# Patient Record
Sex: FEMALE | Race: WHITE | NOT HISPANIC OR LATINO | URBAN - METROPOLITAN AREA
[De-identification: names, ages, dates, MRNs, and addresses within clinical notes are randomized per-mention and may not be internally consistent; named-entity substitution may affect disease eponyms.]

---

## 2017-04-25 ENCOUNTER — APPOINTMENT (OUTPATIENT)
Dept: URBAN - METROPOLITAN AREA CLINIC 282 | Age: 62
Setting detail: DERMATOLOGY
End: 2017-04-26

## 2017-04-25 DIAGNOSIS — Z12.83 ENCOUNTER FOR SCREENING FOR MALIGNANT NEOPLASM OF SKIN: ICD-10-CM

## 2017-04-25 DIAGNOSIS — L98.8 OTHER SPECIFIED DISORDERS OF THE SKIN AND SUBCUTANEOUS TISSUE: ICD-10-CM

## 2017-04-25 DIAGNOSIS — Z71.89 OTHER SPECIFIED COUNSELING: ICD-10-CM

## 2017-04-25 DIAGNOSIS — D18.0 HEMANGIOMA: ICD-10-CM

## 2017-04-25 DIAGNOSIS — D22 MELANOCYTIC NEVI: ICD-10-CM

## 2017-04-25 PROBLEM — D22.5 MELANOCYTIC NEVI OF TRUNK: Status: ACTIVE | Noted: 2017-04-25

## 2017-04-25 PROBLEM — D22.71 MELANOCYTIC NEVI OF RIGHT LOWER LIMB, INCLUDING HIP: Status: ACTIVE | Noted: 2017-04-25

## 2017-04-25 PROBLEM — D18.01 HEMANGIOMA OF SKIN AND SUBCUTANEOUS TISSUE: Status: ACTIVE | Noted: 2017-04-25

## 2017-04-25 PROBLEM — D22.62 MELANOCYTIC NEVI OF LEFT UPPER LIMB, INCLUDING SHOULDER: Status: ACTIVE | Noted: 2017-04-25

## 2017-04-25 PROBLEM — D22.72 MELANOCYTIC NEVI OF LEFT LOWER LIMB, INCLUDING HIP: Status: ACTIVE | Noted: 2017-04-25

## 2017-04-25 PROBLEM — D22.61 MELANOCYTIC NEVI OF RIGHT UPPER LIMB, INCLUDING SHOULDER: Status: ACTIVE | Noted: 2017-04-25

## 2017-04-25 PROCEDURE — OTHER OBSERVATION AND MEASURE: OTHER

## 2017-04-25 PROCEDURE — OTHER COUNSELING: OTHER

## 2017-04-25 PROCEDURE — OTHER OBSERVATION: OTHER

## 2017-04-25 PROCEDURE — 99214 OFFICE O/P EST MOD 30 MIN: CPT

## 2017-04-25 ASSESSMENT — LOCATION DETAILED DESCRIPTION DERM
LOCATION DETAILED: RIGHT PROXIMAL LATERAL POSTERIOR UPPER ARM
LOCATION DETAILED: SUPERIOR THORACIC SPINE
LOCATION DETAILED: LEFT SUPERIOR MEDIAL MIDBACK
LOCATION DETAILED: LEFT ANTERIOR DISTAL UPPER ARM
LOCATION DETAILED: LEFT ULNAR DORSAL HAND
LOCATION DETAILED: RIGHT LATERAL ZYGOMA
LOCATION DETAILED: LEFT INFERIOR LATERAL MIDBACK
LOCATION DETAILED: LEFT ANTERIOR DISTAL THIGH
LOCATION DETAILED: INFERIOR THORACIC SPINE
LOCATION DETAILED: LEFT DISTAL POSTERIOR UPPER ARM
LOCATION DETAILED: RIGHT ANTERIOR PROXIMAL THIGH
LOCATION DETAILED: RIGHT BUTTOCK
LOCATION DETAILED: RIGHT DISTAL POSTERIOR THIGH

## 2017-04-25 ASSESSMENT — LOCATION SIMPLE DESCRIPTION DERM
LOCATION SIMPLE: RIGHT BUTTOCK
LOCATION SIMPLE: LEFT HAND
LOCATION SIMPLE: LEFT THIGH
LOCATION SIMPLE: LEFT LOWER BACK
LOCATION SIMPLE: LEFT UPPER ARM
LOCATION SIMPLE: LEFT POSTERIOR UPPER ARM
LOCATION SIMPLE: RIGHT POSTERIOR UPPER ARM
LOCATION SIMPLE: UPPER BACK
LOCATION SIMPLE: RIGHT THIGH
LOCATION SIMPLE: RIGHT ZYGOMA
LOCATION SIMPLE: RIGHT POSTERIOR THIGH

## 2017-04-25 ASSESSMENT — LOCATION ZONE DERM
LOCATION ZONE: HAND
LOCATION ZONE: ARM
LOCATION ZONE: LEG
LOCATION ZONE: TRUNK
LOCATION ZONE: FACE

## 2017-05-17 NOTE — PROCEDURE: OBSERVATION
[facility]    [address]    [facility] [address]    Phone:  [phone]       Thank You for choosing us for your health care visit. We are glad to serve you and happy to provide you with this summary of your visit. Please help us to ensure we have accurate records. If you find anything that needs to be changed, please let our staff know as soon as possible.          Your Demographic Information     Patient Name Sex     Brianna Clement Female 1957       Ethnic Group Patient Race    Not of  or  Origin White      Your Visit Details     Date & Time Provider Department    2017 10:00 AM MD [facility]      Your To Do List     Future Orders Please Complete On or Around Expires    MAMMO SCREENING BILATERAL  Jul 15, 2017 (Approximate) May 17, 2018    Follow-Up    Return in about 1 year (around 2018) for annual gyn exam.      We Ordered or Performed the Following     SERVICE TO PHYSICAL THERAPY       Conditions Discussed Today or Order-Related Diagnoses        Comments    Well woman exam with routine gynecological exam    -  Primary     Acquired absence of organ, genital organs         Encounter for screening mammogram for malignant neoplasm of breast         Leg pain, left           Your Vitals Were     BP Pulse Height Weight BMI Smoking Status    118/68 (BP [place] [facility] Position: Sitting, Cuff Size: Regular) 76 5' 7\" (1.702 m) 209 lb (94.8 kg) 32.73 kg/m2 Never Smoker      Medications Prescribed or Re-Ordered Today     estradiol (ESTRACE) 1 MG tablet    Sig - Route: Take 1 tablet by mouth daily. - Oral    Class: Normal      Your Current Medications Are        Disp Refills Start End    DISPENSE   2/3/2017     Sig: Per pt taking Inflavonoid Relief of Minor Pain- 1 tablet daily    Class: Historical Med    metroNIDAZOLE (METROCREAM) 0.75 % cream 45 g 4 2016     Sig: Apply topically twice 
a day for rosacea    Class: Eprescribe    Naproxen Sodium (ALEVE) 220 MG capsule        Sig - Route: Take 220 mg by mouth 2 times daily (with meals). - Oral    Class: Historical Med    SUMAtriptan (IMITREX) 20 MG/ACT nasal spray 1 each 3 2013     Sig: USE 1 SPRAY IN ONE NOSTRIL FOR MIGRAINE AS DIRECTED    Class: Eprescribe    Ascorbic Acid (VITAMIN C CR) 500 MG TBCR 1 tablet 0 2012     Sig - Route: Take 2 tablets by mouth 2 times daily. - Oral    Class: Historical Med    Glucosamine-Chondroitin (GLUCOSAMINE CHONDRO COMPLEX) 500-400 MG OR TABS 0 0 2010     Sig - Route: TWO TABLETS DAILY - Oral    Class: Historical Med    CALCIUM 600 + D 600-200 MG-UNIT PO TABS 0 0 2009     Class: Historical Med    Route: Oral    FLAX SEED OIL 1000 MG PO CAPS 0 0 2006     Sig - Route: ONE CAPSULE TWICE DAILY - Oral    Class: OTC    FISH OIL 1000 MG PO CAPS 0 0 2006     Sig - Route: ONE CAPSULE TWICE DAILY - Oral    Class: OTC    MULTI-VITAMIN TABS   PO 0 0 2001     Siqd    Class: OTC    estradiol (ESTRACE) 1 MG tablet 90 tablet 5 2017    Sig - Route: Take 1 tablet by mouth daily. - Oral    sodium phosphates (OSMOPREP) 1.102-0.398 G Tab 32 tablet 0 2017     Sig: Osmo-Follow mailed instructions    Class: Eprescribe      Discontinued Medications        Reason for Discontinue    amoxicillin (AMOXIL) 875 MG tablet Therapy Completed      Allergies     No Known Allergies      Immunizations History as of 2017     Name Date    HERPES ZOSTER SHINGLES 2017    INFLUENZA QUADRIVALENT 12/3/2014    Influenza 2009, 10/1/2006, 2005, 1/3/2005, 2000    Influenza Preservative Free 2016    Td:Adult type tetanus/diphtheria 2006, 1997    Tdap 2016 10:30 AM      Problem List as of 2017     Rosacea    Migraine, unspecified, without mention of intractable migraine without mention of status migrainosus    Acquired absence of organ, genital organs    
Chronic rhinitis    Cervical spondylosis without myelopathy    Arthralgia of temporomandibular joint    Abnormality of gait compensatory for Big toe.    Metatarsalgia              Patient Instructions    You should receive the results of any labwork ordered or performed today within two weeks of its completion.  If you do not receive notification of the results, contact our telephone nurses at [phone].    I will contact Dr. Bob regarding your colonoscopy prep and get back to you with his recommendations.    Consider weaning off your estrogen. If you have any issues, please contact my nursing staff at the above telephone number.    You should see one of our internal medicine doctors regarding your need for travel vaccinations. I would recommend Dr. David Vincent, Dr. Talia Goldberg or Dr. Darlene Guy.       
Detail Level: Detailed
Size Of Lesion: 7mm
Size Of Lesion: 6mm
Size Of Lesion: 5
Size Of Lesion: 8mm
Size Of Lesion: 4
Size Of Lesion: 5mm
Size Of Lesion: 1.3

## 2019-06-13 NOTE — HPI: SKIN LESION
6/13/19 3:21 PM     Site & comments:   RIGHT KNEE    NDC: [address]-75    Lot number: [id number]    Product:  [id number]
Subjective    Patient ID: Papo Menezes is a 69 y.o..  female. Chief Complaint   Patient presents with    Knee Pain     RIGHT   # 1 [address]       Patient presents today for the 1st injection of Orthovisc  . Pt has been previously diagnosed with osteoarthritis of the knee as documented in the prior progress notes. This injection is for the patients Right knee. Patient denies and fevers, chills, recent infections, or new injuries to the knee. Pain Assessment:       Patient's medications, allergies, past medical, surgical, social and family histories were reviewed and updated as appropriate. Physical Exam:    The patient does have  pain on motion, there is an effusion, there is tenderness over the  medial, lateral region. No erythema noted. Sensation intact to touch. Pulses present distally. Procedure Note:    The patients Right knee was initially prepped using Betadine swab. The superior lateral aspect of the knee was prepped and used for this injection. Under aseptic technique the soft tissues were anesthetized using 1% Lidocaine without epinephrine. A total of 2ml of Lidocaine was injected into the soft tissues leading up to the joint capsule. Following adequate anesthesia, approximately 21 mL of clear yellow fluid was aspirated and then the 2ml of Orthovisc  injection was performed. This was injected directly into the joint capsule of the knee. No complications were encountered and the patient tolerated the procedure well. A band aid was placed over the injection site following the procedure.       MT ARTHROCENTESIS ASPIR&/INJ MAJOR JT/BURSA W/O US  ORTHOVISC INJ PER DOSE  Assessment:  1) Orthovisc injection of the Right knee  2) Osteoarthritis    Plan:  1) ice, elevation, gentle range of motion as needed for swelling or stiffness of the knee  2) NSAIDs for any pain after injection   3) F/U 1wk for 2nd injection
How Severe Is Your Skin Lesion?: mild
Has Your Skin Lesion Been Treated?: not been treated
Is This A New Presentation, Or A Follow-Up?: Skin Lesion

## 2021-10-27 ENCOUNTER — OFFICE VISIT (OUTPATIENT)
Dept: URBAN - METROPOLITAN AREA CLINIC 30 | Facility: CLINIC | Age: 66
End: 2021-10-27
Payer: MEDICARE

## 2021-10-27 DIAGNOSIS — H04.123 TEAR FILM INSUFFICIENCY OF BILATERAL LACRIMAL GLANDS: Primary | ICD-10-CM

## 2021-10-27 DIAGNOSIS — H00.021 HORDEOLUM INTERNUM RIGHT UPPER LID: ICD-10-CM

## 2021-10-27 DIAGNOSIS — H43.813 VITREOUS DEGENERATION, BILATERAL: ICD-10-CM

## 2021-10-27 PROCEDURE — 92134 CPTRZ OPH DX IMG PST SGM RTA: CPT | Performed by: OPTOMETRIST

## 2021-10-27 PROCEDURE — 99204 OFFICE O/P NEW MOD 45 MIN: CPT | Performed by: OPTOMETRIST

## 2021-10-27 ASSESSMENT — VISUAL ACUITY
OS: 20/25
OD: 20/25

## 2021-10-27 ASSESSMENT — KERATOMETRY
OS: 46.09
OD: 45.93

## 2021-10-27 ASSESSMENT — INTRAOCULAR PRESSURE
OD: 13
OS: 16

## 2021-10-27 NOTE — IMPRESSION/PLAN
Impression: Vitreous degeneration, bilateral: H43.813. Plan: All signs and risks of retinal detachment or tears were discussed in detail. If pt notices any symptoms discussed or worsen, contact office ASAP. Recommend pt. return for normal recall. MAC OCT done today.

## 2021-10-27 NOTE — IMPRESSION/PLAN
Impression: Tear film insufficiency of bilateral lacrimal glands: H04.123. Plan: Recommend AT's qid OU. Recommend O3's. Avoid ceiling fans. + SCLW B&L Ultra. + computer user.

## 2021-10-27 NOTE — IMPRESSION/PLAN
Impression: Combined forms of age-related cataract, bilateral: H25.813. Plan:  Discussed cataracts with patient. Discussed treatment options. Surgical treatment is recommended. Surgical risks and benefits discussed. Patient elects surgical treatment. Recommend surgery OU, OD first. toric lens/astigmatism correction, standard lens, LenSx, ORA. Aim OD: plano. Aim OS: plano. Patient will need glasses for all near work, including computer. ASCAN needed. SCLW.

## 2021-11-10 ENCOUNTER — OFFICE VISIT (OUTPATIENT)
Dept: URBAN - METROPOLITAN AREA CLINIC 30 | Facility: CLINIC | Age: 66
End: 2021-11-10
Payer: MEDICARE

## 2021-11-10 DIAGNOSIS — H43.811 VITREOUS DEGENERATION, RIGHT EYE: Primary | ICD-10-CM

## 2021-11-10 PROCEDURE — 99213 OFFICE O/P EST LOW 20 MIN: CPT | Performed by: OPTOMETRIST

## 2021-11-10 PROCEDURE — 92134 CPTRZ OPH DX IMG PST SGM RTA: CPT | Performed by: OPTOMETRIST

## 2021-11-10 ASSESSMENT — INTRAOCULAR PRESSURE
OS: 15
OD: 13

## 2021-11-10 NOTE — IMPRESSION/PLAN
Impression: Vitreous degeneration, right eye: H43.811. Plan: Flashes and floaters over the past 3 days per patient. Pt educ on findings. Retinas are flat and attached OU. Reviewed s/sx of RD and to call asap if occurs. MAC OCT WNL OU. Check early December before cataract surgery.

## 2021-12-02 ENCOUNTER — OFFICE VISIT (OUTPATIENT)
Dept: URBAN - METROPOLITAN AREA CLINIC 30 | Facility: CLINIC | Age: 66
End: 2021-12-02
Payer: MEDICARE

## 2021-12-02 PROCEDURE — 99212 OFFICE O/P EST SF 10 MIN: CPT | Performed by: OPTOMETRIST

## 2021-12-02 PROCEDURE — 92134 CPTRZ OPH DX IMG PST SGM RTA: CPT | Performed by: OPTOMETRIST

## 2021-12-02 ASSESSMENT — INTRAOCULAR PRESSURE
OD: 14
OS: 14

## 2021-12-02 NOTE — IMPRESSION/PLAN
Impression: Vitreous degeneration, right eye: H43.811. Plan: Flashes have ceased and floaters less noticeable per pt. Retinas are flat and attached OU. Reviewed s/sx of RD and to call asap if occurs. MAC OCT WNL OU. Ok to proceed with cataract sx. Discussed floater may be more visible after cat sx and PVD may occur OS after sx.

## 2021-12-07 ENCOUNTER — TESTING ONLY (OUTPATIENT)
Dept: URBAN - METROPOLITAN AREA CLINIC 30 | Facility: CLINIC | Age: 66
End: 2021-12-07
Payer: MEDICARE

## 2021-12-07 DIAGNOSIS — Z01.818 ENCOUNTER FOR OTHER PREPROCEDURAL EXAMINATION: Primary | ICD-10-CM

## 2021-12-07 DIAGNOSIS — H25.813 COMBINED FORMS OF AGE-RELATED CATARACT, BILATERAL: ICD-10-CM

## 2021-12-07 PROCEDURE — 99203 OFFICE O/P NEW LOW 30 MIN: CPT | Performed by: PHYSICIAN ASSISTANT

## 2021-12-07 PROCEDURE — 92025 CPTRIZED CORNEAL TOPOGRAPHY: CPT | Performed by: OPHTHALMOLOGY

## 2021-12-07 RX ORDER — LEVOTHYROXINE SODIUM 100 UG/1
TABLET ORAL
Qty: 0 | Refills: 0 | Status: ACTIVE
Start: 2021-12-07

## 2021-12-07 RX ORDER — ESTROGENS, CONJUGATED 0.62 MG/1
0.625 MG TABLET, FILM COATED ORAL
Qty: 0 | Refills: 0 | Status: ACTIVE
Start: 2021-12-07

## 2021-12-07 ASSESSMENT — PACHYMETRY
OS: 3.37
OD: 3.44
OD: 23.50
OS: 23.60

## 2021-12-09 ENCOUNTER — PRE-OPERATIVE VISIT (OUTPATIENT)
Dept: URBAN - METROPOLITAN AREA CLINIC 10 | Facility: CLINIC | Age: 66
End: 2021-12-09
Payer: MEDICARE

## 2021-12-09 DIAGNOSIS — H25.812 COMBINED FORMS OF AGE-RELATED CATARACT, LEFT EYE: ICD-10-CM

## 2021-12-09 DIAGNOSIS — H52.223 REGULAR ASTIGMATISM, BILATERAL: ICD-10-CM

## 2021-12-09 PROCEDURE — 99204 OFFICE O/P NEW MOD 45 MIN: CPT | Performed by: OPHTHALMOLOGY

## 2021-12-09 ASSESSMENT — PACHYMETRY
OD: 23.50
OS: 23.60
OS: 3.37
OD: 3.44

## 2021-12-09 NOTE — IMPRESSION/PLAN
Impression: Combined forms of age-related cataract, bilateral: H25.813. Plan: Discussed cataract diagnosis with the patient. Discussed and reviewed treatment options for cataracts. Surgical treatment is required for cataracts. Risks and benefits of surgical treatment were discussed and understood. Patient elects surgical treatment. Recommend surgery OU,OD first.  PANOPTIX LENS, WITH LENSX/ORA, AIM: PLANO,  PLAN LRI, REVIEWED ESTHER AND OCT . Discussed there is no perfect lens, possible need of glasses, and may have glare and halos.  Discussed limitations to vision post op due to DRY EYES, VITREOUS DEGENERATION  If first eye doing well, ok to proceed with second eye surgery,  INJECTABLE MEDICATION

## 2021-12-16 ENCOUNTER — SURGERY (OUTPATIENT)
Dept: URBAN - METROPOLITAN AREA SURGERY 5 | Facility: SURGERY | Age: 66
End: 2021-12-16
Payer: MEDICARE

## 2021-12-16 PROCEDURE — 66984 XCAPSL CTRC RMVL W/O ECP: CPT | Performed by: OPHTHALMOLOGY

## 2021-12-17 ENCOUNTER — POST-OPERATIVE VISIT (OUTPATIENT)
Dept: URBAN - METROPOLITAN AREA CLINIC 30 | Facility: CLINIC | Age: 66
End: 2021-12-17
Payer: MEDICARE

## 2021-12-17 ASSESSMENT — INTRAOCULAR PRESSURE: OD: 17

## 2021-12-17 NOTE — IMPRESSION/PLAN
Impression: S/P Cataract Extraction by phacoemulsification with IOL placement; Lensx/ORA OD - 1 Day. Encounter for surgical aftercare following surgery on a sense organ  Z48.810. Plan: Continue ATs TID-QID OU. Panoptix TORIC- RTC as scheduled. Dilate OD x 2.

## 2021-12-23 ENCOUNTER — POST-OPERATIVE VISIT (OUTPATIENT)
Dept: URBAN - METROPOLITAN AREA CLINIC 30 | Facility: CLINIC | Age: 66
End: 2021-12-23

## 2021-12-23 DIAGNOSIS — Z48.810 ENCOUNTER FOR SURGICAL AFTERCARE FOLLOWING SURGERY ON A SENSE ORGAN: Primary | ICD-10-CM

## 2021-12-23 ASSESSMENT — INTRAOCULAR PRESSURE
OS: 13
OD: 17

## 2021-12-23 ASSESSMENT — KERATOMETRY
OS: 45.96
OD: 45.90

## 2021-12-23 ASSESSMENT — VISUAL ACUITY
OD: 20/25
OS: 20/50

## 2021-12-23 NOTE — IMPRESSION/PLAN
Impression: S/P Cataract Extraction by phacoemulsification with IOL placement; Lensx/ORA OD - 7 Days. Encounter for surgical aftercare following surgery on a sense organ  Z48.810.  Plan: intended axis was not noted on PO form, measured at 090
doing well
ok to proceed with 2nd eye

## 2021-12-30 ENCOUNTER — SURGERY (OUTPATIENT)
Dept: URBAN - METROPOLITAN AREA SURGERY 5 | Facility: SURGERY | Age: 66
End: 2021-12-30
Payer: MEDICARE

## 2021-12-30 PROCEDURE — 66984 XCAPSL CTRC RMVL W/O ECP: CPT | Performed by: OPHTHALMOLOGY

## 2021-12-31 ENCOUNTER — POST-OPERATIVE VISIT (OUTPATIENT)
Dept: URBAN - METROPOLITAN AREA CLINIC 10 | Facility: CLINIC | Age: 66
End: 2021-12-31

## 2021-12-31 PROCEDURE — 99024 POSTOP FOLLOW-UP VISIT: CPT | Performed by: OPTOMETRIST

## 2021-12-31 ASSESSMENT — INTRAOCULAR PRESSURE: OS: 18

## 2021-12-31 NOTE — IMPRESSION/PLAN
Impression: S/P Cataract Extraction by phacoemulsification with IOL placement; LENSX/ORA OS - 1 Day. Presence of intraocular lens  Z96.1. Plan: Doing well POD1. Swelling and inflammation noted. Restrictions apply for 1 week. Discussed with patient that vision will continue to improve as swelling and inflammation resolves. Recommend ATs at least 2-3 times per day or more OU. RTC next week for post op (DILATE OS-Toric IOL).

## 2022-01-07 ENCOUNTER — POST-OPERATIVE VISIT (OUTPATIENT)
Dept: URBAN - METROPOLITAN AREA CLINIC 30 | Facility: CLINIC | Age: 67
End: 2022-01-07

## 2022-01-07 ASSESSMENT — INTRAOCULAR PRESSURE
OS: 16
OD: 14

## 2022-01-07 ASSESSMENT — VISUAL ACUITY
OS: 20/30
OD: 20/30

## 2022-01-07 ASSESSMENT — KERATOMETRY
OD: 46.27
OS: 45.97

## 2022-01-07 NOTE — IMPRESSION/PLAN
Impression: S/P Cataract Extraction by phacoemulsification with IOL placement; LENSX/ORA OS - 8 Days. Presence of intraocular lens  Z96.1. Plan: Intended axis: 091
overall doing well, using +1.25 reader prn when feels fatigued RTC for final PO

## 2022-01-27 ENCOUNTER — POST-OPERATIVE VISIT (OUTPATIENT)
Dept: URBAN - METROPOLITAN AREA CLINIC 30 | Facility: CLINIC | Age: 67
End: 2022-01-27
Payer: MEDICARE

## 2022-01-27 DIAGNOSIS — Z96.1 PRESENCE OF INTRAOCULAR LENS: Primary | ICD-10-CM

## 2022-01-27 ASSESSMENT — KERATOMETRY
OS: 46.09
OD: 45.92

## 2022-01-27 ASSESSMENT — VISUAL ACUITY
OD: 20/20
OS: 20/25

## 2022-01-27 ASSESSMENT — INTRAOCULAR PRESSURE
OS: 18
OD: 18

## 2022-01-27 NOTE — IMPRESSION/PLAN
Impression: S/P Cataract Extraction by phacoemulsification with IOL placement; LENSX/ORA OS - 28 Days. Presence of intraocular lens  Z96.1. Post operative instructions reviewed - Plan: Doing well. Allow time for adaptation. Cont ATs QID OU. Panoptix toric IOL OU. RTC 3 mos.  PO

## 2022-04-28 ENCOUNTER — POST-OPERATIVE VISIT (OUTPATIENT)
Dept: URBAN - METROPOLITAN AREA CLINIC 30 | Facility: CLINIC | Age: 67
End: 2022-04-28
Payer: MEDICARE

## 2022-04-28 DIAGNOSIS — Z96.1 PRESENCE OF INTRAOCULAR LENS: Primary | ICD-10-CM

## 2022-04-28 DIAGNOSIS — H43.811 VITREOUS DEGENERATION, RIGHT EYE: ICD-10-CM

## 2022-04-28 PROCEDURE — 92134 CPTRZ OPH DX IMG PST SGM RTA: CPT | Performed by: OPTOMETRIST

## 2022-04-28 ASSESSMENT — KERATOMETRY
OS: 46.06
OD: 45.98

## 2022-04-28 ASSESSMENT — VISUAL ACUITY
OS: 20/20
OD: 20/20

## 2022-04-28 ASSESSMENT — INTRAOCULAR PRESSURE
OD: 13
OS: 13

## 2022-04-28 NOTE — IMPRESSION/PLAN
Impression: S/P Cataract Extraction by phacoemulsification with IOL placement; LENSX/ORA OS - 119 Days. Presence of intraocular lens  Z96.1. Plan: Pt reassurance of floaters. RD precautions. Cont AT's - may help with blurry VA. +SLE/fibromyalgia. RTC 3 month PO FU.

## 2022-07-29 ENCOUNTER — POST-OPERATIVE VISIT (OUTPATIENT)
Dept: URBAN - METROPOLITAN AREA CLINIC 30 | Facility: CLINIC | Age: 67
End: 2022-07-29
Payer: MEDICARE

## 2022-07-29 DIAGNOSIS — Z96.1 PRESENCE OF INTRAOCULAR LENS: Primary | ICD-10-CM

## 2022-07-29 PROCEDURE — 99024 POSTOP FOLLOW-UP VISIT: CPT | Performed by: OPTOMETRIST

## 2022-07-29 ASSESSMENT — VISUAL ACUITY
OD: 20/25
OS: 20/25

## 2022-07-29 ASSESSMENT — INTRAOCULAR PRESSURE
OS: 14
OD: 14

## 2022-07-29 NOTE — IMPRESSION/PLAN
Impression: S/P Cataract Extraction by phacoemulsification with IOL placement; LENSX/ORA OS - 211 Days. Presence of intraocular lens  Z96.1. Plan: Pt doing well. Panoptix OU. Consider YAG OU in future. Some dysphotopsia. Tried Vuity - burns.

## 2022-11-21 ENCOUNTER — OFFICE VISIT (OUTPATIENT)
Dept: URBAN - METROPOLITAN AREA CLINIC 30 | Facility: CLINIC | Age: 67
End: 2022-11-21
Payer: MEDICARE

## 2022-11-21 DIAGNOSIS — H00.021 HORDEOLUM INTERNUM RIGHT UPPER LID: ICD-10-CM

## 2022-11-21 DIAGNOSIS — H43.811 VITREOUS DEGENERATION, RIGHT EYE: ICD-10-CM

## 2022-11-21 DIAGNOSIS — H43.813 VITREOUS DEGENERATION, BILATERAL: Primary | ICD-10-CM

## 2022-11-21 DIAGNOSIS — H04.123 TEAR FILM INSUFFICIENCY OF BILATERAL LACRIMAL GLANDS: ICD-10-CM

## 2022-11-21 DIAGNOSIS — H26.493 OTHER SECONDARY CATARACT, BILATERAL: ICD-10-CM

## 2022-11-21 PROCEDURE — 92134 CPTRZ OPH DX IMG PST SGM RTA: CPT | Performed by: OPTOMETRIST

## 2022-11-21 PROCEDURE — 99214 OFFICE O/P EST MOD 30 MIN: CPT | Performed by: OPTOMETRIST

## 2022-11-21 ASSESSMENT — INTRAOCULAR PRESSURE
OD: 14
OS: 15

## 2022-11-21 ASSESSMENT — KERATOMETRY
OS: 45.88
OD: 46.00

## 2022-11-21 NOTE — IMPRESSION/PLAN
Impression: Other secondary cataract, bilateral: H26.493. Plan: Opacified capsule with option for YAG laser capsulotomy. Discussed procedure, risks, benefits and side effects. Patient requests YAG laser capsulotomy.  YAG OU, OS first.

## 2022-11-21 NOTE — IMPRESSION/PLAN
Impression: Vitreous degeneration, bilateral: H43.813. Plan: All signs and risks of retinal detachment or tears were discussed in detail. If pt notices any symptoms discussed or worsen, contact office ASAP. Recommend pt. return for normal recall. Monitor.

## 2022-11-21 NOTE — IMPRESSION/PLAN
Impression: Vitreous degeneration, right eye: H43.811. Plan: All signs and risks of retinal detachment or tears were discussed in detail. If pt notices any symptoms discussed or worsen, contact office ASAP. Recommend pt. return for normal recall. Monitor.

## 2022-11-21 NOTE — IMPRESSION/PLAN
Impression: Hordeolum internum right upper lid: H00.021. Plan: Mild MGD OU. Recommend WCs qhs OU. Monitor.

## 2022-11-21 NOTE — IMPRESSION/PLAN
Impression: Tear film insufficiency of bilateral lacrimal glands: H04.123. Plan: Recommend use of AT's qid OU. Recommend O3's. Avoid ceiling fans. + SCLW B&L Ultra. + computer user.

## 2022-12-06 ENCOUNTER — SURGERY (OUTPATIENT)
Dept: URBAN - METROPOLITAN AREA SURGERY 12 | Facility: SURGERY | Age: 67
End: 2022-12-06
Payer: MEDICARE

## 2022-12-06 PROCEDURE — 66821 AFTER CATARACT LASER SURGERY: CPT | Performed by: OPHTHALMOLOGY

## 2022-12-20 ENCOUNTER — SURGERY (OUTPATIENT)
Dept: URBAN - METROPOLITAN AREA SURGERY 12 | Facility: SURGERY | Age: 67
End: 2022-12-20
Payer: MEDICARE

## 2022-12-20 PROCEDURE — 66821 AFTER CATARACT LASER SURGERY: CPT | Performed by: OPHTHALMOLOGY

## 2023-07-24 ENCOUNTER — OFFICE VISIT (OUTPATIENT)
Dept: URBAN - METROPOLITAN AREA CLINIC 30 | Facility: CLINIC | Age: 68
End: 2023-07-24
Payer: MEDICARE

## 2023-07-24 DIAGNOSIS — H04.123 TEAR FILM INSUFFICIENCY OF BILATERAL LACRIMAL GLANDS: ICD-10-CM

## 2023-07-24 DIAGNOSIS — H53.15 VISUAL DISTORTIONS OF SHAPE AND SIZE: ICD-10-CM

## 2023-07-24 DIAGNOSIS — H00.021 HORDEOLUM INTERNUM RIGHT UPPER LID: ICD-10-CM

## 2023-07-24 DIAGNOSIS — Z96.1 PRESENCE OF INTRAOCULAR LENS: ICD-10-CM

## 2023-07-24 DIAGNOSIS — H43.813 VITREOUS DEGENERATION, BILATERAL: Primary | ICD-10-CM

## 2023-07-24 DIAGNOSIS — H52.4 PRESBYOPIA: ICD-10-CM

## 2023-07-24 PROCEDURE — 99213 OFFICE O/P EST LOW 20 MIN: CPT | Performed by: OPTOMETRIST

## 2023-07-24 PROCEDURE — 83861 MICROFLUID ANALY TEARS: CPT | Performed by: OPTOMETRIST

## 2023-07-24 ASSESSMENT — INTRAOCULAR PRESSURE
OS: 14
OD: 14

## 2023-07-24 ASSESSMENT — VISUAL ACUITY
OS: 20/20
OD: 20/20

## 2023-07-24 ASSESSMENT — KERATOMETRY
OD: 45.95
OS: 46.15

## 2024-07-29 ENCOUNTER — OFFICE VISIT (OUTPATIENT)
Dept: URBAN - METROPOLITAN AREA CLINIC 30 | Facility: CLINIC | Age: 69
End: 2024-07-29
Payer: MEDICARE

## 2024-07-29 DIAGNOSIS — H43.813 VITREOUS DEGENERATION, BILATERAL: ICD-10-CM

## 2024-07-29 DIAGNOSIS — H04.123 TEAR FILM INSUFFICIENCY OF BILATERAL LACRIMAL GLANDS: ICD-10-CM

## 2024-07-29 DIAGNOSIS — Z96.1 PRESENCE OF INTRAOCULAR LENS: Primary | ICD-10-CM

## 2024-07-29 DIAGNOSIS — H53.15 VISUAL DISTORTIONS OF SHAPE AND SIZE: ICD-10-CM

## 2024-07-29 PROCEDURE — 0330T TEAR FILM IMG UNI/BI W/I&R: CPT | Performed by: OPTOMETRIST

## 2024-07-29 PROCEDURE — 99213 OFFICE O/P EST LOW 20 MIN: CPT | Performed by: OPTOMETRIST

## 2024-07-29 ASSESSMENT — INTRAOCULAR PRESSURE
OS: 16
OD: 15

## 2024-07-29 ASSESSMENT — VISUAL ACUITY
OS: 20/30
OD: 20/30

## 2024-07-29 ASSESSMENT — KERATOMETRY
OS: 46.22
OD: 45.92

## 2025-07-28 ENCOUNTER — OFFICE VISIT (OUTPATIENT)
Dept: URBAN - METROPOLITAN AREA CLINIC 30 | Facility: CLINIC | Age: 70
End: 2025-07-28
Payer: MEDICARE

## 2025-07-28 DIAGNOSIS — H43.813 VITREOUS DEGENERATION, BILATERAL: ICD-10-CM

## 2025-07-28 DIAGNOSIS — H04.123 TEAR FILM INSUFFICIENCY OF BILATERAL LACRIMAL GLANDS: ICD-10-CM

## 2025-07-28 DIAGNOSIS — Z96.1 PRESENCE OF INTRAOCULAR LENS: Primary | ICD-10-CM

## 2025-07-28 PROCEDURE — 92134 CPTRZ OPH DX IMG PST SGM RTA: CPT | Performed by: OPTOMETRIST

## 2025-07-28 PROCEDURE — 99213 OFFICE O/P EST LOW 20 MIN: CPT | Performed by: OPTOMETRIST

## 2025-07-28 ASSESSMENT — KERATOMETRY
OS: 46.25
OD: 46.13

## 2025-07-28 ASSESSMENT — INTRAOCULAR PRESSURE
OD: 14
OS: 16

## 2025-07-28 ASSESSMENT — VISUAL ACUITY
OD: 20/20
OS: 20/20